# Patient Record
Sex: FEMALE | Race: WHITE
[De-identification: names, ages, dates, MRNs, and addresses within clinical notes are randomized per-mention and may not be internally consistent; named-entity substitution may affect disease eponyms.]

---

## 2020-02-27 ENCOUNTER — HOSPITAL ENCOUNTER (EMERGENCY)
Dept: HOSPITAL 75 - ER | Age: 40
Discharge: HOME | End: 2020-02-27
Payer: SELF-PAY

## 2020-02-27 VITALS — WEIGHT: 193.57 LBS | BODY MASS INDEX: 28.67 KG/M2 | HEIGHT: 68.9 IN

## 2020-02-27 VITALS — SYSTOLIC BLOOD PRESSURE: 123 MMHG | DIASTOLIC BLOOD PRESSURE: 74 MMHG

## 2020-02-27 DIAGNOSIS — I10: ICD-10-CM

## 2020-02-27 DIAGNOSIS — F17.210: ICD-10-CM

## 2020-02-27 DIAGNOSIS — R42: ICD-10-CM

## 2020-02-27 DIAGNOSIS — R11.2: ICD-10-CM

## 2020-02-27 DIAGNOSIS — R10.9: Primary | ICD-10-CM

## 2020-02-27 LAB
ALBUMIN SERPL-MCNC: 4.3 GM/DL (ref 3.2–4.5)
ALP SERPL-CCNC: 75 U/L (ref 40–136)
ALT SERPL-CCNC: 13 U/L (ref 0–55)
APTT PPP: YELLOW S
BACTERIA #/AREA URNS HPF: NEGATIVE /HPF
BASOPHILS # BLD AUTO: 0 10^3/UL (ref 0–0.1)
BASOPHILS NFR BLD AUTO: 0 % (ref 0–10)
BILIRUB SERPL-MCNC: 0.3 MG/DL (ref 0.1–1)
BILIRUB UR QL STRIP: NEGATIVE
BUN/CREAT SERPL: 5
CALCIUM SERPL-MCNC: 9.5 MG/DL (ref 8.5–10.1)
CHLORIDE SERPL-SCNC: 107 MMOL/L (ref 98–107)
CO2 SERPL-SCNC: 26 MMOL/L (ref 21–32)
CREAT SERPL-MCNC: 0.87 MG/DL (ref 0.6–1.3)
EOSINOPHIL # BLD AUTO: 0 10^3/UL (ref 0–0.3)
EOSINOPHIL NFR BLD AUTO: 0 % (ref 0–10)
ERYTHROCYTE [DISTWIDTH] IN BLOOD BY AUTOMATED COUNT: 14.5 % (ref 10–14.5)
FIBRINOGEN PPP-MCNC: CLEAR MG/DL
GFR SERPLBLD BASED ON 1.73 SQ M-ARVRAT: > 60 ML/MIN
GLUCOSE SERPL-MCNC: 90 MG/DL (ref 70–105)
GLUCOSE UR STRIP-MCNC: NEGATIVE MG/DL
HCT VFR BLD CALC: 38 % (ref 35–52)
HGB BLD-MCNC: 12.9 G/DL (ref 11.5–16)
KETONES UR QL STRIP: NEGATIVE
LEUKOCYTE ESTERASE UR QL STRIP: NEGATIVE
LIPASE SERPL-CCNC: 28 U/L (ref 8–78)
LYMPHOCYTES # BLD AUTO: 3.3 X 10^3 (ref 1–4)
LYMPHOCYTES NFR BLD AUTO: 49 % (ref 12–44)
MAGNESIUM SERPL-MCNC: 1.5 MG/DL (ref 1.6–2.4)
MANUAL DIFFERENTIAL PERFORMED BLD QL: NO
MCH RBC QN AUTO: 28 PG (ref 25–34)
MCHC RBC AUTO-ENTMCNC: 34 G/DL (ref 32–36)
MCV RBC AUTO: 82 FL (ref 80–99)
MONOCYTES # BLD AUTO: 0.6 X 10^3 (ref 0–1)
MONOCYTES NFR BLD AUTO: 9 % (ref 0–12)
NEUTROPHILS # BLD AUTO: 2.8 X 10^3 (ref 1.8–7.8)
NEUTROPHILS NFR BLD AUTO: 42 % (ref 42–75)
NITRITE UR QL STRIP: NEGATIVE
PH UR STRIP: 7 [PH] (ref 5–9)
PLATELET # BLD: 251 10^3/UL (ref 130–400)
PMV BLD AUTO: 10.9 FL (ref 7.4–10.4)
POTASSIUM SERPL-SCNC: 3.5 MMOL/L (ref 3.6–5)
PROT SERPL-MCNC: 6.9 GM/DL (ref 6.4–8.2)
PROT UR QL STRIP: NEGATIVE
RBC #/AREA URNS HPF: (no result) /HPF
SODIUM SERPL-SCNC: 142 MMOL/L (ref 135–145)
SP GR UR STRIP: <=1.005 (ref 1.02–1.02)
SQUAMOUS #/AREA URNS HPF: (no result) /HPF
WBC # BLD AUTO: 6.8 10^3/UL (ref 4.3–11)
WBC #/AREA URNS HPF: (no result) /HPF

## 2020-02-27 PROCEDURE — 74177 CT ABD & PELVIS W/CONTRAST: CPT

## 2020-02-27 PROCEDURE — 85025 COMPLETE CBC W/AUTO DIFF WBC: CPT

## 2020-02-27 PROCEDURE — 83735 ASSAY OF MAGNESIUM: CPT

## 2020-02-27 PROCEDURE — 83690 ASSAY OF LIPASE: CPT

## 2020-02-27 PROCEDURE — 81000 URINALYSIS NONAUTO W/SCOPE: CPT

## 2020-02-27 PROCEDURE — 80053 COMPREHEN METABOLIC PANEL: CPT

## 2020-02-27 PROCEDURE — 36415 COLL VENOUS BLD VENIPUNCTURE: CPT

## 2020-02-27 NOTE — ED ABDOMINAL PAIN
General


Chief Complaint:  Abdominal/GI Problems


Stated Complaint:  PAIN IN RIGHT SIDE, VOMITING


Nursing Triage Note:  


PT PRESENTS TO ED WITH COMPLAINTS OF N/V AND R SIDED ABDOMINAL PAIN X 12 DAYS. 


PT REPORTS SHE WAS SEEN AT [facility] ON 20 AND GIVEN FLUIDS AND PHENERGAN 


SUPPOSITORIES. PT REPORTS SHE COULD NOT TOLERATE THE SUPPOSITORIES AND SHE 


STOPPED TAKING THEM. PT REPORTS SHE HAS BEEN TAKING ZOFRAN BUT CONTINUES TO HAVE




EPISODES OF NAUSEA/VOMITING.


Sepsis Screen:  No Definite Risk


Source of Information:  Patient


Exam Limitations:  No Limitations





History of Present Illness


Date Seen by Provider:  2020


Time Seen by Provider:  18:25


Initial Comments


This 39-year-old woman presents to the emergency room by private vehicle with 

concerns about daily vomiting for the past 12 days.  She reports vomiting 8 

times today.  She also has right flank pain that she rates as a 6/10.  She 

states this feels similar to prior kidney stones.  She notes increased urine 

frequency with lower volume but no hematuria or dysuria.  She is vomiting 

despite taking Zofran.  She has questionable constipation based on x-ray done 

earlier in the week.  She had a normal bowel movement today.  She denies fever. 

She has history of biliary dyskinesia status post cholecystectomy.  She also 

denies pregnancy as she has had a hysterectomy.  She is quite concerned because 

she has missed 2 days of work because of this and she has a daughter getting 

 in 2 days.  [facility] in [place], [place].  Patient describes a little bit of dizziness when she moves her eyes, 

but not when she moves her head.





Allergies and Home Medications


Allergies


Coded Allergies:  


     No Known Drug Allergies (Unverified , 20)





Home Medications


Lisinopril 20 Mg Tablet, 20 MG PO DAILY, (Reported)


Promethazine HCl 25 Mg Tablet, 25 MG PO Q8H PRN for NAUSEA/VOMITING


   Prescribed by: PIEDAD YAN on 20





Patient Home Medication List


Home Medication List Reviewed:  Yes





Review of Systems


Review of Systems


Constitutional:  no symptoms reported


EENTM:  No Symptoms Reported


Respiratory:  No Symptoms Reported


Cardiovascular:  No Symptoms Reported


Gastrointestinal:  See HPI


Genitourinary:  See HPI


Musculoskeletal:  no symptoms reported


Skin:  no symptoms reported


Psychiatric/Neurological:  No Symptoms Reported


Endocrine:  No Symptoms Reported


Hematologic/Lymphatic:  No Symptoms Reported





Past Medical-Social-Family Hx


Past Med/Social Hx:  Reviewed Nursing Past Med/Soc Hx


Patient Social History


Alcohol Use:  Rarely Uses


Recreational Drug Use:  Yes


Drug of Choice:  thc


Smoking Status:  Current Everyday Smoker


Type Used:  Cigarettes


Recent Foreign Travel:  No


Contact w/Someone Who Travel:  No


Recent Infectious Disease Expo:  No


Recent Hopitalizations:  No





Seasonal Allergies


Seasonal Allergies:  No





Past Medical History


Surgeries:  Yes (r shoulder, l hip)


 Section, Gallbladder, Hysterectomy, Orthopedic, Tubal Ligation


Respiratory:  No


Cardiac:  Yes


Hypertension


Neurological:  No


Genitourinary:  Yes


Kidney Stones


Gastrointestinal:  Yes


Ulcer


Musculoskeletal:  No


Endocrine:  No


Cancer:  No


Psychosocial:  Yes


Bipolar


Integumentary:  No


Blood Disorders:  No





Physical Exam


Vital Signs





Vital Signs - First Documented








 20





 18:36


 


Temp 37.4


 


Pulse 84


 


Resp 16


 


B/P (MAP) 126/98 (107)


 


Pulse Ox 97





Capillary Refill : Less Than 3 Seconds


Height/Weight/BMI


Height: '"


Weight: lbs. oz. kg; 28.00 BMI


Method:


General Appearance:  WD/WN, mild distress


HEENT:  PERRL/EOMI, normal ENT inspection, other (mucous membranes somewhat dry)


Neck:  normal inspection


Respiratory:  lungs clear, normal breath sounds, no respiratory distress, no 

accessory muscle use


Cardiovascular:  regular rate, rhythm, no edema, no murmur


Gastrointestinal:  normal bowel sounds, soft, tenderness (right flank)


Extremities:  normal inspection, no pedal edema


Neurologic/Psychiatric:  CNs II-XII nml as tested, no motor/sensory deficits, 

alert, normal mood/affect, oriented x 3


Skin:  normal color, warm/dry





Progress/Results/Core Measures


Results/Orders


Lab Results





Laboratory Tests








Test


 20


18:39 20


19:15 Range/Units


 


 


White Blood Count


 6.8 


 


 4.3-11.0


10^3/uL


 


Red Blood Count


 4.69 


 


 4.35-5.85


10^6/uL


 


Hemoglobin 12.9   11.5-16.0  G/DL


 


Hematocrit 38   35-52  %


 


Mean Corpuscular Volume 82   80-99  FL


 


Mean Corpuscular Hemoglobin 28   25-34  PG


 


Mean Corpuscular Hemoglobin


Concent 34 


 


 32-36  G/DL





 


Red Cell Distribution Width 14.5   10.0-14.5  %


 


Platelet Count


 251 


 


 130-400


10^3/uL


 


Mean Platelet Volume 10.9 H  7.4-10.4  FL


 


Neutrophils (%) (Auto) 42   42-75  %


 


Lymphocytes (%) (Auto) 49 H  12-44  %


 


Monocytes (%) (Auto) 9   0-12  %


 


Eosinophils (%) (Auto) 0   0-10  %


 


Basophils (%) (Auto) 0   0-10  %


 


Neutrophils # (Auto) 2.8   1.8-7.8  X 10^3


 


Lymphocytes # (Auto) 3.3   1.0-4.0  X 10^3


 


Monocytes # (Auto) 0.6   0.0-1.0  X 10^3


 


Eosinophils # (Auto)


 0.0 


 


 0.0-0.3


10^3/uL


 


Basophils # (Auto)


 0.0 


 


 0.0-0.1


10^3/uL


 


Sodium Level 142   135-145  MMOL/L


 


Potassium Level 3.5 L  3.6-5.0  MMOL/L


 


Chloride Level 107     MMOL/L


 


Carbon Dioxide Level 26   21-32  MMOL/L


 


Anion Gap 9   5-14  MMOL/L


 


Blood Urea Nitrogen 4 L  7-18  MG/DL


 


Creatinine


 0.87 


 


 0.60-1.30


MG/DL


 


Estimat Glomerular Filtration


Rate > 60 


 


  





 


BUN/Creatinine Ratio 5    


 


Glucose Level 90     MG/DL


 


Calcium Level 9.5   8.5-10.1  MG/DL


 


Corrected Calcium 9.3   8.5-10.1  MG/DL


 


Magnesium Level 1.5 L  1.6-2.4  MG/DL


 


Total Bilirubin 0.3   0.1-1.0  MG/DL


 


Aspartate Amino Transf


(AST/SGOT) 17 


 


 5-34  U/L





 


Alanine Aminotransferase


(ALT/SGPT) 13 


 


 0-55  U/L





 


Alkaline Phosphatase 75     U/L


 


Total Protein 6.9   6.4-8.2  GM/DL


 


Albumin 4.3   3.2-4.5  GM/DL


 


Lipase 28   8-78  U/L


 


Urine Color  YELLOW   


 


Urine Clarity  CLEAR   


 


Urine pH  7.0  5-9  


 


Urine Specific Gravity  <=1.005  1.016-1.022  


 


Urine Protein  NEGATIVE  NEGATIVE  


 


Urine Glucose (UA)  NEGATIVE  NEGATIVE  


 


Urine Ketones  NEGATIVE  NEGATIVE  


 


Urine Nitrite  NEGATIVE  NEGATIVE  


 


Urine Bilirubin  NEGATIVE  NEGATIVE  


 


Urine Urobilinogen  0.2  < = 1.0  MG/DL


 


Urine Leukocyte Esterase  NEGATIVE  NEGATIVE  


 


Urine RBC (Auto)  NEGATIVE  NEGATIVE  


 


Urine RBC  NONE   /HPF


 


Urine WBC  NONE   /HPF


 


Urine Squamous Epithelial


Cells 


 0-2 


  /HPF





 


Urine Crystals  NONE   /LPF


 


Urine Bacteria  NEGATIVE   /HPF


 


Urine Casts  NONE   /LPF


 


Urine Mucus  NEGATIVE   /LPF


 


Urine Culture Indicated  NO   








My Orders





Orders - PIEDAD VARGHESE MD


Ua Culture If Indicated (20 18:25)


Cbc With Automated Diff (20 18:43)


Comprehensive Metabolic Panel (20 18:43)


Lipase (20 18:43)


Magnesium (20 18:43)


Ed Iv/Invasive Line Start (20 18:43)


Ns Iv 1000 Ml (Sodium Chloride 0.9%) (20 18:43)


Ketorolac Injection (Toradol Injection) (20 18:45)


Promethazine Injection (Phenergan Injec (20 18:45)


Ct Abdomen/Pelvis W (20 19:53)


Iohexol Injection (Omnipaque 350 Mg/Ml 1 (20 20:00)


Received Contrast (Hold Metformin- Contr (20 20:00)


Sodium Chloride Flush (Catheter Flush Sy (20 20:00)


Ns (Ivpb) (Sodium Chloride 0.9% Ivpb Bag (20 20:00)


Famotidine Injection (Pepcid Injection) (20 21:00)


Meclizine Tablet (Antivert Tablet) (20 21:00)





Medications Given in ED





Current Medications








 Medications  Dose


 Ordered  Sig/Janet


 Route  Start Time


 Stop Time Status Last Admin


Dose Admin


 


 Famotidine  20 mg  ONCE  ONCE


 IVP  20 21:00


 20 21:01 DC 20 20:59


20 MG


 


 Iohexol  100 ml  ONCE  ONCE


 IV  20 20:00


 20 20:01 DC 20 20:12


100 ML


 


 Ketorolac


 Tromethamine  15 mg  ONCE  ONCE


 IVP  20 18:45


 20 18:52 DC 20 18:55


15 MG


 


 Meclizine HCl  25 mg  ONCE  ONCE


 PO  20 21:00


 20 21:01 DC 20 20:59


25 MG


 


 Promethazine HCl  25 mg  ONCE  ONCE


 IVP  20 18:45


 20 18:52 DC 20 18:55


25 MG


 


 Sodium Chloride  10 ml  AS NEEDED  PRN


 IV  20 20:00


    20 20:12


10 ML


 


 Sodium Chloride  100 ml  ONCE  ONCE


 IV  20 20:00


 20 20:01 DC 20 20:12


80 ML


 


 Sodium Chloride  1,000 ml @ 


 0 mls/hr  Q0M ONCE


 IV  20 18:43


 20 18:52 DC 20 18:55


0 MLS/HR








Vital Signs/I&O











 20





 18:36


 


Temp 37.4


 


Pulse 84


 


Resp 16


 


B/P (MAP) 126/98 (107)


 


Pulse Ox 97














Blood Pressure Mean:                    107











Progress


Progress Note #1:  


   Time:  18:51


Progress Note


Patient was seen and examined.  IV fluids with Phenergan has been ordered for 

hydration and nausea control.  Toradol has been ordered for pain management.  

Labs and UA are pending.


Progress Note #2:  


   Time:  19:58


Progress Note


Workup thus far has been relatively unremarkable.  I discussed further 

evaluation with CT scan with the patient.  Risks discussed included cost, 

exposure to IV contrast, and radiation exposure increasing cancer risk.  

Benefits of evaluating for ureteral stone and appendicitis were also discussed. 

We discussed lab results as well.  There is a lymphocytic predominance which 

would suggest viral etiology.  However, since this illness has been so 

disruptive to her life and been persistent for 12 days, she would like to 

proceed with the CT scan.


Progress Note #3:  


   Time:  21:08


Progress Note


Patient is feeling largely improved.  CT was negative.  Imaging was reviewed 

with the radiologist.  We discussed results.  I recommended that she follow up 

soon with her primary care provider as she may need further workup if symptoms 

do not improve.  We discussed other possible etiologies such as duodenal ulcer 

that could cause pain in this location.  She has some dizziness still with 

movement of her eyes but states this is improving with Phenergan.  We will add 

some meclizine.  I will also give Pepcid prior to dismissal.  Phenergan is being

prescribed to add to her Zofran.  See discharge instructions.





Diagnostic Imaging





   Diagonstic Imaging:  CT


   Plain Films/CT/US/NM/MRI:  abdomen, pelvis


Comments


CT abdomen and pelvis viewed by me and report reviewed.  I discussed with the 

radiologist.  See report below:





NAME:   XIN NAVARRO


[facility] REC#:   [id number]


ACCOUNT#:   [id number]


PT STATUS:   REG ER


:   1980


PHYSICIAN:   PIEDAD VARGHESE MD


ADMIT DATE:   20/ER


***Draft***


Date of Exam:20





CT ABDOMEN/PELVIS W





PROCEDURE: CT abdomen and pelvis with contrast.





TECHNIQUE: Multiple contiguous axial images were obtained through


the abdomen and pelvis after administration of intravenous


contrast. Auto Exposure Controls were utilized during the CT exam


to meet ALARA standards for radiation dose reduction. 





INDICATION: Vomiting with right flank pain. Past surgical history


includes hysterectomy, , laparotomy and cholecystectomy.


History of cervical cancer.





FINDINGS: The heart size is normal. The lung bases are clear. The


liver is normal in size. Gallbladder is surgically absent. There


is no biliary ductal dilatation. Spleen is normal. The pancreas


and adrenal glands are unremarkable. The kidneys are normal in


appearance. Specifically, there is no evidence of nephrolithiasis


or obstructive uropathy. The aorta is nonaneurysmal. The bowel


gas pattern is nonspecific. There is no free air. There is no


ascites. No focal inflammatory change. Bladder is normal. No


pelvic mass or adenopathy. The osseous structures are


unremarkable.





IMPRESSION: No acute abnormality in the abdomen or pelvis.


Specifically, there is no evidence of nephrolithiasis or


obstructive uropathy.





  Dictated on workstation # [id number]





Dict:   20


Trans:   20


[facility] [id number]





Interpreted by:     RADHA TOWNSEND MD





Departure


Impression





   Primary Impression:  


   Right flank pain


   Additional Impressions:  


   Acute vomiting


   Dizziness


Disposition:   HOME, SELF-CARE


Condition:  Improved





Departure-Patient Inst.


Decision time for Depature:  20:53


Patient Instructions:  Acute Abdomen (Belly Pain), Adult (DC)





Add. Discharge Instructions:  


Adhere to a noncarbonated clear liquid diet for the next 24 hours.  Drink plenty

of clear liquids to stay well-hydrated.  Then gradually advance your diet with 

small quantities of bland food as tolerated.


You may continue using Zofran for primary nausea control.  Add Phenergan for br

eakthrough nausea and vomiting.


Try using meclizine which can be purchased over-the-counter for dizziness if 

needed.


Please be aware that Phenergan (promethazine) and meclizine can both cause 

drowsiness.


Take an antacid such as Pepcid (famotidine) 20 mg twice daily or omeprazole 20 

mg twice daily for the next 2 weeks.


Return to emergency room if you have worsening symptoms despite these measures.


Call your primary care provider tomorrow to arrange follow-up.  If symptoms do 

not resolve soon, you may need further workup such as endoscopy.





All discharge instructions reviewed with patient and/or family. Voiced 

understanding.


Scripts


Promethazine HCl (Promethazine Tablet) 25 Mg Tablet


25 MG PO Q8H PRN for NAUSEA/VOMITING, #10 TAB


   Prov: PIEDAD VARGHESE MD         20


Work/School Note:  Work Release Form   Date Seen in the Emergency Department:  

2020


   Return to Work:  2020


   Restrictions:  No Restrictions











PIEDAD VARGHESE MD        2020 18:51

## 2020-02-27 NOTE — DIAGNOSTIC IMAGING REPORT
PROCEDURE: CT abdomen and pelvis with contrast.



TECHNIQUE: Multiple contiguous axial images were obtained through

the abdomen and pelvis after administration of intravenous

contrast. Auto Exposure Controls were utilized during the CT exam

to meet ALARA standards for radiation dose reduction. 



INDICATION: Vomiting with right flank pain. Past surgical history

includes hysterectomy, , laparotomy and cholecystectomy.

History of cervical cancer.



FINDINGS: The heart size is normal. The lung bases are clear. The

liver is normal in size. Gallbladder is surgically absent. There

is no biliary ductal dilatation. Spleen is normal. The pancreas

and adrenal glands are unremarkable. The kidneys are normal in

appearance. Specifically, there is no evidence of nephrolithiasis

or obstructive uropathy. The aorta is nonaneurysmal. The bowel

gas pattern is nonspecific. There is no free air. There is no

ascites. No focal inflammatory change. Bladder is normal. No

pelvic mass or adenopathy. The osseous structures are

unremarkable.



IMPRESSION: No acute abnormality in the abdomen or pelvis.

Specifically, there is no evidence of nephrolithiasis or

obstructive uropathy.



Dictated by: 



  Dictated on workstation # [id number]